# Patient Record
Sex: MALE | Race: BLACK OR AFRICAN AMERICAN | NOT HISPANIC OR LATINO | Employment: UNEMPLOYED | URBAN - METROPOLITAN AREA
[De-identification: names, ages, dates, MRNs, and addresses within clinical notes are randomized per-mention and may not be internally consistent; named-entity substitution may affect disease eponyms.]

---

## 2024-07-13 ENCOUNTER — APPOINTMENT (OUTPATIENT)
Dept: RADIOLOGY | Facility: HOSPITAL | Age: 64
End: 2024-07-13
Payer: COMMERCIAL

## 2024-07-13 ENCOUNTER — HOSPITAL ENCOUNTER (EMERGENCY)
Facility: HOSPITAL | Age: 64
Discharge: HOME | End: 2024-07-13
Payer: COMMERCIAL

## 2024-07-13 VITALS
OXYGEN SATURATION: 98 % | BODY MASS INDEX: 25.01 KG/M2 | SYSTOLIC BLOOD PRESSURE: 131 MMHG | HEART RATE: 68 BPM | WEIGHT: 165 LBS | RESPIRATION RATE: 16 BRPM | TEMPERATURE: 97.5 F | DIASTOLIC BLOOD PRESSURE: 80 MMHG | HEIGHT: 68 IN

## 2024-07-13 DIAGNOSIS — K08.89 LOOSE, TEETH: ICD-10-CM

## 2024-07-13 DIAGNOSIS — S00.83XA FACIAL CONTUSION, INITIAL ENCOUNTER: ICD-10-CM

## 2024-07-13 DIAGNOSIS — S09.90XA CLOSED HEAD INJURY, INITIAL ENCOUNTER: Primary | ICD-10-CM

## 2024-07-13 DIAGNOSIS — K02.9 DENTAL CARIES: ICD-10-CM

## 2024-07-13 PROCEDURE — 70486 CT MAXILLOFACIAL W/O DYE: CPT | Performed by: RADIOLOGY

## 2024-07-13 PROCEDURE — 76377 3D RENDER W/INTRP POSTPROCES: CPT | Performed by: RADIOLOGY

## 2024-07-13 PROCEDURE — 76377 3D RENDER W/INTRP POSTPROCES: CPT

## 2024-07-13 PROCEDURE — 70486 CT MAXILLOFACIAL W/O DYE: CPT

## 2024-07-13 PROCEDURE — 96372 THER/PROPH/DIAG INJ SC/IM: CPT | Performed by: NURSE PRACTITIONER

## 2024-07-13 PROCEDURE — 99284 EMERGENCY DEPT VISIT MOD MDM: CPT | Mod: 25

## 2024-07-13 PROCEDURE — 2500000004 HC RX 250 GENERAL PHARMACY W/ HCPCS (ALT 636 FOR OP/ED): Performed by: NURSE PRACTITIONER

## 2024-07-13 RX ORDER — ACETAMINOPHEN 325 MG/1
975 TABLET ORAL ONCE
Status: COMPLETED | OUTPATIENT
Start: 2024-07-13 | End: 2024-07-13

## 2024-07-13 RX ORDER — KETOROLAC TROMETHAMINE 30 MG/ML
30 INJECTION, SOLUTION INTRAMUSCULAR; INTRAVENOUS ONCE
Status: COMPLETED | OUTPATIENT
Start: 2024-07-13 | End: 2024-07-13

## 2024-07-13 RX ORDER — ACETAMINOPHEN 325 MG/1
650 TABLET ORAL EVERY 6 HOURS PRN
Qty: 30 TABLET | Refills: 0 | Status: SHIPPED | OUTPATIENT
Start: 2024-07-13

## 2024-07-13 RX ORDER — NAPROXEN 375 MG/1
375 TABLET ORAL 2 TIMES DAILY PRN
Qty: 14 TABLET | Refills: 0 | Status: SHIPPED | OUTPATIENT
Start: 2024-07-13 | End: 2024-07-20

## 2024-07-13 RX ADMIN — KETOROLAC TROMETHAMINE 30 MG: 30 INJECTION, SOLUTION INTRAMUSCULAR at 17:19

## 2024-07-13 RX ADMIN — ACETAMINOPHEN 975 MG: 325 TABLET ORAL at 17:19

## 2024-07-13 ASSESSMENT — PAIN - FUNCTIONAL ASSESSMENT
PAIN_FUNCTIONAL_ASSESSMENT: 0-10
PAIN_FUNCTIONAL_ASSESSMENT: 0-10

## 2024-07-13 ASSESSMENT — COLUMBIA-SUICIDE SEVERITY RATING SCALE - C-SSRS
2. HAVE YOU ACTUALLY HAD ANY THOUGHTS OF KILLING YOURSELF?: NO
6. HAVE YOU EVER DONE ANYTHING, STARTED TO DO ANYTHING, OR PREPARED TO DO ANYTHING TO END YOUR LIFE?: NO
1. IN THE PAST MONTH, HAVE YOU WISHED YOU WERE DEAD OR WISHED YOU COULD GO TO SLEEP AND NOT WAKE UP?: NO

## 2024-07-13 ASSESSMENT — PAIN DESCRIPTION - DESCRIPTORS: DESCRIPTORS: ACHING

## 2024-07-13 ASSESSMENT — PAIN DESCRIPTION - LOCATION: LOCATION: HEAD

## 2024-07-13 ASSESSMENT — LIFESTYLE VARIABLES
HAVE PEOPLE ANNOYED YOU BY CRITICIZING YOUR DRINKING: NO
HAVE YOU EVER FELT YOU SHOULD CUT DOWN ON YOUR DRINKING: NO
EVER HAD A DRINK FIRST THING IN THE MORNING TO STEADY YOUR NERVES TO GET RID OF A HANGOVER: NO
TOTAL SCORE: 0
EVER FELT BAD OR GUILTY ABOUT YOUR DRINKING: NO

## 2024-07-13 ASSESSMENT — PAIN SCALES - GENERAL
PAINLEVEL_OUTOF10: 4
PAINLEVEL_OUTOF10: 9

## 2024-07-13 ASSESSMENT — PAIN DESCRIPTION - PAIN TYPE: TYPE: ACUTE PAIN

## 2024-07-13 ASSESSMENT — VISUAL ACUITY: OU: 1

## 2024-07-13 NOTE — ED PROVIDER NOTES
HPI   Chief Complaint   Patient presents with    Head Injury     PT. STATES CEILING FAN FELL ONTO HIM TODAY, HIT HIS HEAD/FACE. PT. STATES RIGHT UPPER TOOTH LOOSE. PT. ALSO C/O PAIN TO RIGHT NECK AND BACK. DENIES LOC. DENIES BLOOD THINNERS.       Patient is a 64-year-old male with past medical history of hypertension who presents ED today due to right facial pain.  Patient states that he was in his bedroom and the ceiling fan fell striking him in the right forehead and face.  Patient states that he is having mild headache and upper back pain after this happened.  Patient also stating that some of his teeth feel loose.  Patient denies the fan striking him in the mouth.      History provided by:  Patient   used: No                        Dae Coma Scale Score: 15                     Patient History   Past Medical History:   Diagnosis Date    Hypertension      History reviewed. No pertinent surgical history.  No family history on file.  Social History     Tobacco Use    Smoking status: Never    Smokeless tobacco: Never   Vaping Use    Vaping status: Never Used   Substance Use Topics    Alcohol use: Yes     Comment: SOCIALLY    Drug use: Never       Physical Exam   ED Triage Vitals [07/13/24 1705]   Temperature Heart Rate Respirations BP   36.4 °C (97.5 °F) 97 16 140/75      Pulse Ox Temp Source Heart Rate Source Patient Position   98 % Temporal Monitor --      BP Location FiO2 (%)     Right arm --       Physical Exam  Vitals and nursing note reviewed.   Constitutional:       General: He is not in acute distress.     Appearance: Normal appearance. He is not toxic-appearing or diaphoretic.      Interventions: Cervical collar in place.   HENT:      Head: Normocephalic. Contusion (Slight ecchymosis superior to the right eyebrow) present.      Comments: No palpable hematoma, calvarium nontender to palpation, no tenderness palpation of the face, no midface instability, no malocclusion, no fractured  teeth, no intraoral bleeding or trauma     Right Ear: Tympanic membrane normal. No hemotympanum.      Left Ear: Tympanic membrane normal. No hemotympanum.      Nose: Nose normal. No nasal tenderness.      Right Nostril: No epistaxis or septal hematoma.      Left Nostril: No epistaxis or septal hematoma.      Comments: No bleeding from bilateral naris, no tenderness palpation     Mouth/Throat:      Mouth: Mucous membranes are moist. No injury.      Pharynx: Oropharynx is clear. No oropharyngeal exudate or posterior oropharyngeal erythema.   Eyes:      General: Lids are normal. Vision grossly intact.      Extraocular Movements: Extraocular movements intact.      Right eye: Normal extraocular motion.      Left eye: Normal extraocular motion.      Conjunctiva/sclera: Conjunctivae normal.      Right eye: No hemorrhage.     Left eye: No hemorrhage.     Pupils: Pupils are equal, round, and reactive to light.   Cardiovascular:      Rate and Rhythm: Normal rate and regular rhythm.      Pulses:           Radial pulses are 2+ on the right side and 2+ on the left side.        Femoral pulses are 2+ on the right side and 2+ on the left side.       Dorsalis pedis pulses are 2+ on the right side and 2+ on the left side.        Posterior tibial pulses are 2+ on the right side and 2+ on the left side.   Pulmonary:      Effort: No respiratory distress.      Breath sounds: Normal breath sounds.   Abdominal:      General: Abdomen is flat.      Tenderness: There is no abdominal tenderness. There is no guarding or rebound.   Musculoskeletal:         General: No swelling, tenderness, deformity or signs of injury.      Right shoulder: Normal. No deformity or tenderness.      Left shoulder: Normal. No deformity or tenderness.      Right upper arm: Normal. No deformity or tenderness.      Left upper arm: Normal. No deformity or tenderness.      Right elbow: Normal. No deformity. No tenderness.      Left elbow: Normal. No deformity. No  tenderness.      Right forearm: Normal. No deformity or tenderness.      Left forearm: Normal. No deformity or tenderness.      Right wrist: Normal. No deformity or tenderness.      Left wrist: Normal. No deformity or tenderness.      Right hand: Normal. No deformity or tenderness.      Left hand: Normal. No deformity or tenderness.      Cervical back: No deformity, tenderness or bony tenderness. No spinous process tenderness.      Thoracic back: No deformity or bony tenderness.      Lumbar back: No deformity or bony tenderness.      Right hip: No deformity or tenderness.      Left hip: No deformity or tenderness.      Right upper leg: No deformity or tenderness.      Left upper leg: No deformity or tenderness.      Right knee: No deformity. No tenderness.      Left knee: No deformity. No tenderness.      Right lower leg: No deformity or tenderness.      Left lower leg: No deformity or tenderness.      Right ankle: No deformity. No tenderness.      Left ankle: No deformity. No tenderness.      Right foot: No deformity or tenderness.      Left foot: No deformity or tenderness.      Comments: No C,T or L-spine tenderness, no step-offs or deformities.  No tenderness to palpation abrasion or evidence of injury bilateral upper or lower extremities   Skin:     General: Skin is warm.      Capillary Refill: Capillary refill takes less than 2 seconds.      Findings: No abrasion, bruising, laceration or lesion.   Neurological:      General: No focal deficit present.      Mental Status: He is alert and oriented to person, place, and time.      GCS: GCS eye subscore is 4. GCS verbal subscore is 5. GCS motor subscore is 6.      Sensory: Sensation is intact.      Comments: GCS 15   Psychiatric:         Mood and Affect: Mood normal.         Behavior: Behavior normal.       ED Course & [facility]   ED Course as of 07/13/24 1938   Sat Jul 13, 2024 1924 CT maxillofacial bones wo IV contrast  No definite evidence of acute displaced  maxillofacial fracture.      Chronic appearing fracture deformity nasal bones with mild depression  of left nasal bone fracture.      Multiple dental caries and periodontal disease.   [WS]      ED Course User Index  [WS] Capo Lopez, APRN-CNP         Diagnoses as of 07/13/24 1938   Closed head injury, initial encounter   Facial contusion, initial encounter   Loose, teeth   Dental caries       Medical Decision Making  Differential diagnosis: Facial contusion, facial fracture, post head injury, upper back strain.  Tooth avulsion, tooth decay.  Patient's vital signs are stable.  Patient is low risk for intracranial hemorrhage and cervical fracture per [place] C-spine and head CT rules.  Given patient's facial pain we did opt to obtain CT imaging of patient's face.  Patient is having no midline spinal tenderness so we did not obtain x-ray imaging or CT imaging of patient's spines.  Patient states that the fan did not strike him in the mouth.  Patient has multiple areas of tooth decay, is unclear whether teeth are chronically loose due to decay or this is acute in nature.  There is no evidence of trauma however there is no chipping to the teeth or blood along the gumline consistent with a subluxation of the teeth.  Patient will be advised to follow-up with her dentist for this.  Patient's CT did note periodontal disease and dental caries which may be why his teeth are loose at this time.  Sided precautions were discussed with patient and he verbalized understanding of these.  Return to ED precautions were discussed with patient and patient verbalized understanding of these.      I discussed the differential, results and discharge plan with the patient.  I emphasized the importance of follow-up with the physician I referred them to in the timeframe recommended.  I explained reasons for the them to return to the Emergency Department. Additional verbal discharge instructions were also given and discussed with them to  supplement those generated by the EMR. We also discussed medications that were prescribed (if any) and appropriate use of OTC medications including common side effects and interactions. All questions were addressed.  They understand return precautions and discharge instructions. They expressed understanding.          Amount and/or Complexity of Data Reviewed  Radiology: ordered and independent interpretation performed. Decision-making details documented in ED Course.    Risk  OTC drugs.  Prescription drug management.        Procedure  Procedures     QUANG Peterson-JUDIE  07/13/24 1045

## 2024-07-13 NOTE — ED TRIAGE NOTES
PT. STATES CEILING FAN FELL ONTO HIM TODAY, HIT HIS HEAD/FACE. PT. STATES RIGHT UPPER TOOTH LOOSE. PT. ALSO C/O PAIN TO RIGHT NECK AND BACK. DENIES LOC. DENIES BLOOD THINNERS.